# Patient Record
Sex: FEMALE | Race: BLACK OR AFRICAN AMERICAN | NOT HISPANIC OR LATINO
[De-identification: names, ages, dates, MRNs, and addresses within clinical notes are randomized per-mention and may not be internally consistent; named-entity substitution may affect disease eponyms.]

---

## 2019-02-20 PROBLEM — Z00.00 ENCOUNTER FOR PREVENTIVE HEALTH EXAMINATION: Status: ACTIVE | Noted: 2019-02-20

## 2019-02-28 ENCOUNTER — APPOINTMENT (OUTPATIENT)
Dept: PULMONOLOGY | Facility: CLINIC | Age: 44
End: 2019-02-28
Payer: MEDICAID

## 2019-02-28 VITALS
WEIGHT: 140 LBS | SYSTOLIC BLOOD PRESSURE: 98 MMHG | BODY MASS INDEX: 20.27 KG/M2 | HEIGHT: 69.5 IN | DIASTOLIC BLOOD PRESSURE: 70 MMHG | TEMPERATURE: 97.9 F

## 2019-02-28 DIAGNOSIS — Z87.891 PERSONAL HISTORY OF NICOTINE DEPENDENCE: ICD-10-CM

## 2019-02-28 DIAGNOSIS — Z82.5 FAMILY HISTORY OF ASTHMA AND OTHER CHRONIC LOWER RESPIRATORY DISEASES: ICD-10-CM

## 2019-02-28 PROCEDURE — 99204 OFFICE O/P NEW MOD 45 MIN: CPT | Mod: 25

## 2019-02-28 PROCEDURE — 94010 BREATHING CAPACITY TEST: CPT

## 2019-02-28 NOTE — CONSULT LETTER
[Dear  ___] : Dear  [unfilled], [( Thank you for referring [unfilled] for consultation for _____ )] : Thank you for referring [unfilled] for consultation for [unfilled] [Please see my note below.] : Please see my note below. [Consult Closing:] : Thank you very much for allowing me to participate in the care of this patient.  If you have any questions, please do not hesitate to contact me. [Sincerely,] : Sincerely, [FreeTextEntry2] : Isidro Delvalle MD\par [address] \[place] [address] [FreeTextEntry3] : Marychuy Allison MD, FCCP\par

## 2019-02-28 NOTE — HISTORY OF PRESENT ILLNESS
[FreeTextEntry1] : Yvan TOBIN.\par \par 02/28/2019: Asked to evaluate patient by Dr Delvalle for cough. She was seen at [facility] for fever and chest congestion 1/28/19. She was told she did not have pneumonia (by CXR) but did have COPD. She was given prednisone. She was given azithromycin but continued to have discolored sputum and so Dr Delvalle gave her another abx which she doesn't know the name of. She quit smoking cigarettes in 2013 but does smoke cigars up till a month ago when she was sick. No chronic cough or dyspnea. Never gets sick. She is well now. Purpose of this consult is to find out of she has COPD.\par

## 2019-02-28 NOTE — ASSESSMENT
[FreeTextEntry1] : Data reviewed:\par \par PA/lat CXR [facility] 1/28/19 report only: clear lungs, scoliosis\par \par [facility] 02/28/2019 : normal\par \par Impression:\par Resolved respiratory infection\par \par Plan:\par No further care or follow up is needed for this illness. She does not have COPD.\par She is encouraged to maintain smoking cessation.

## 2020-10-20 ENCOUNTER — APPOINTMENT (OUTPATIENT)
Dept: NEUROLOGY | Facility: CLINIC | Age: 45
End: 2020-10-20
Payer: MEDICAID

## 2020-10-20 VITALS
HEART RATE: 95 BPM | OXYGEN SATURATION: 98 % | DIASTOLIC BLOOD PRESSURE: 78 MMHG | BODY MASS INDEX: 20.27 KG/M2 | SYSTOLIC BLOOD PRESSURE: 120 MMHG | TEMPERATURE: 98.5 F | HEIGHT: 69.5 IN | WEIGHT: 140 LBS

## 2020-10-20 DIAGNOSIS — G40.909 EPILEPSY, UNSPECIFIED, NOT INTRACTABLE, W/OUT STATUS EPILEPTICUS: ICD-10-CM

## 2020-10-20 DIAGNOSIS — M54.16 RADICULOPATHY, LUMBAR REGION: ICD-10-CM

## 2020-10-20 DIAGNOSIS — M51.27 OTHER INTERVERTEBRAL DISC DISPLACEMENT, LUMBOSACRAL REGION: ICD-10-CM

## 2020-10-20 PROCEDURE — 99204 OFFICE O/P NEW MOD 45 MIN: CPT | Mod: 25

## 2020-10-20 PROCEDURE — 99072 ADDL SUPL MATRL&STAF TM PHE: CPT

## 2020-10-20 PROCEDURE — 95885 MUSC TST DONE W/NERV TST LIM: CPT

## 2020-10-20 PROCEDURE — 95909 NRV CNDJ TST 5-6 STUDIES: CPT

## 2020-10-20 NOTE — PROCEDURE
[FreeTextEntry1] : Nerve Conduction and Electromyography Report [FreeTextEntry3] : Electro Physiologic Findings:\par \par Limb temperature was monitored and maintained at approximately 30 – 34° C in the lower extremities.\par \par The left superficial fibular sensory response was normal. The left tibial motor amplitude was low, while the left was normal. The left fibular motor amplitude was borderline low, although limited by patient discomfort; on the right it was normal. The bilateral fibular and tibial F-waves appeared mildly prolonged, although this was also significantly limited by patient discomfort. \par \par Needle electromyography was performed on select left lower extremity L4-S2 appendicular muscles and the left L5/S1 paraspinal. There was no definite evidence of denervation in any of the muscles tested, although again the test was significantly limited by poor relaxation, submaximal effort, and patient discomfort. \par \par Clinical Electrophysiological Impression: \par \par This was a very limited electrodiagnostic study of the lower extremities. There was a suggestion of a left S1 radiculopathy, which correlates with clinical and radiographic findings. However, this could not be definitively proven due to the limited nature of the study. \par \par Note that despite the limited nature of the study, the lack of denervation suggests that there has not been a great deal of motor axon loss. A lesser degree of motor axon loss is still possible. \par \par There was no definite evidence of polyneuropathy on this study. \par

## 2020-10-20 NOTE — CONSULT LETTER
[Dear  ___] : Dear  [unfilled], [Please see my note below.] : Please see my note below. [Consult Letter:] : I had the pleasure of evaluating your patient, [unfilled]. [Consult Closing:] : Thank you very much for allowing me to participate in the care of this patient.  If you have any questions, please do not hesitate to contact me. [FreeTextEntry3] : Devon Van M.D.\par Neurology, Electromyography and Neuromuscular Medicine\par [facility]\par \par  of Neurology\par [facility] / [facility] [employer] [Sincerely,] : Sincerely, [DrAnna Marie  ___] : Dr. DURON

## 2020-10-20 NOTE — ASSESSMENT
[FreeTextEntry1] : Symptoms largely consistent with left S1 radiculopathy, which is consistent with MRI findings \par \par NCS/EMG today was very limited by severe pain during the procedure. There was a suggestion of a left S1 radiculopathy, which would fit with radiographic and clinical findings; however it could not be definitively proven due to the limited nature of the study. Regardless, it can be said that there is not a great deal of motor axon loss\par \par Recommend f/u with pain management and spine surgery, consider repeat MRI L spine since her symptoms seem to have gotten worse since January 2020. \par \par See separate procedure note for full results of study.

## 2020-10-20 NOTE — PHYSICAL EXAM
[FreeTextEntry1] : Gen: appears well, well-nourished, no acute distress\par \par MS: awake, alert, oriented, speech fluent, comprehension intact, good fund of knowledge, recent and remote memory intact, attention intact\par \par CN: PERRL, EOMI, visual fields full, facial strength and sensation intact and symmetric, hearing grossly intact, palate elevation symmetric, tongue midline, no tongue atrophy or fasciculations, shoulder shrug intact and symmetric\par \par Motor: normal bulk and tone; left leg somewhat pain limited but with encouragement gives 5/5 strength throughout, no abnormal movements\par \par Sensory: light touch intact and symmetric throughout\par \par Reflexes: 2+ symmetric throughout, no Romero’s sign, plantar responses flexor bilaterally\par \par Coordination: no dysmetria on finger to nose, Romberg negative\par \par Gait: somewhat antalgic but otherwise normal \par \par CV: 2+ pulses b/l, no edema\par \par Ophtho: fundi not visualized

## 2020-10-20 NOTE — HISTORY OF PRESENT ILLNESS
[FreeTextEntry1] : CC: back pain \par \par HPI: 45 year old woman referred by Dr. Delvalle for back pain \par \par Location: low back, radiating to left buttock and leg, to the bottom of the foot \par Quality: sharp, shooting pain \par Severity: severe \par Duration: years, but worse in past 6 months \par Timing: constant \par Context: no trauma \par Modifying Factors: percoset helps; first epidural helped, second did not\par Associated signs and symptoms:  tingling in the left leg \par \par Other Problem(s):\par Seizure disorder, last seizure in January 2020 due to medication non-compliance\par \par Data reviewed:\par Imaging (reports): x-ray chest - normal \par Imaging (independently reviewed): MRI L spine - left paracentral disc herniation at L5/S1 with contact of descending left S1 nerve root; scoliosis \par \par ROS: 13 pt review of systems performed and reviewed with patient (General, Eyes, Ears, Cardiovascular, Respiratory, Gastrointestinal, Genitourinary, Musculoskeletal, Skin, Endocrine, Hematologic, Psychiatric, Neurologic)\par Past medical history, surgical history, social history, and family history reviewed with patient\par See scanned document for details

## 2020-10-20 NOTE — CONSULT LETTER
[Dear  ___] : Dear  [unfilled], [Please see my note below.] : Please see my note below. [Consult Letter:] : I had the pleasure of evaluating your patient, [unfilled]. [Consult Closing:] : Thank you very much for allowing me to participate in the care of this patient.  If you have any questions, please do not hesitate to contact me. [FreeTextEntry3] : Devon Van M.D.\par Neurology, Electromyography and Neuromuscular Medicine\par [facility]\par \par  of Neurology\par [facility] / [facility] [employer] [Sincerely,] : Sincerely, [DrAnna Marie  ___] : Dr. DURON

## 2021-06-01 NOTE — PROCEDURE
[FreeTextEntry1] : Nerve Conduction and Electromyography Report [FreeTextEntry3] : Electro Physiologic Findings:\par \par Limb temperature was monitored and maintained at approximately 30 – 34° C in the lower extremities.\par \par The left superficial fibular sensory response was normal. The left tibial motor amplitude was low, while the left was normal. The left fibular motor amplitude was borderline low, although limited by patient discomfort; on the right it was normal. The bilateral fibular and tibial F-waves appeared mildly prolonged, although this was also significantly limited by patient discomfort. \par \par Needle electromyography was performed on select left lower extremity L4-S2 appendicular muscles and the left L5/S1 paraspinal. There was no definite evidence of denervation in any of the muscles tested, although again the test was significantly limited by poor relaxation, submaximal effort, and patient discomfort. \par \par Clinical Electrophysiological Impression: \par \par This was a very limited electrodiagnostic study of the lower extremities. There was a suggestion of a left S1 radiculopathy, which correlates with clinical and radiographic findings. However, this could not be definitively proven due to the limited nature of the study. \par \par Note that despite the limited nature of the study, the lack of denervation suggests that there has not been a great deal of motor axon loss. A lesser degree of motor axon loss is still possible. \par \par There was no definite evidence of polyneuropathy on this study. \par  Home

## 2022-08-22 ENCOUNTER — APPOINTMENT (OUTPATIENT)
Dept: PULMONOLOGY | Facility: CLINIC | Age: 47
End: 2022-08-22

## 2022-08-22 VITALS
DIASTOLIC BLOOD PRESSURE: 70 MMHG | BODY MASS INDEX: 19.26 KG/M2 | TEMPERATURE: 97.6 F | WEIGHT: 133 LBS | SYSTOLIC BLOOD PRESSURE: 118 MMHG | HEIGHT: 69.5 IN

## 2022-08-22 PROCEDURE — 99204 OFFICE O/P NEW MOD 45 MIN: CPT

## 2022-08-22 RX ORDER — ACETAMINOPHEN AND CODEINE PHOSPHATE 300; 60 MG/1; MG/1
300-60 TABLET ORAL
Qty: 21 | Refills: 0 | Status: ACTIVE | COMMUNITY
Start: 2022-07-08

## 2022-08-22 RX ORDER — CARBAMAZEPINE 200 MG/1
200 TABLET ORAL
Qty: 260 | Refills: 0 | Status: ACTIVE | COMMUNITY
Start: 2022-03-02

## 2022-08-22 RX ORDER — AZITHROMYCIN 250 MG/1
250 TABLET, FILM COATED ORAL
Qty: 6 | Refills: 0 | Status: DISCONTINUED | COMMUNITY
Start: 2022-06-30 | End: 2022-08-22

## 2022-08-22 RX ORDER — CYPROHEPTADINE HYDROCHLORIDE 4 MG/1
4 TABLET ORAL
Qty: 30 | Refills: 0 | Status: DISCONTINUED | COMMUNITY
Start: 2022-07-06 | End: 2022-08-22

## 2022-08-22 RX ORDER — POLYETHYLENE GLYCOL-3350 AND ELECTROLYTES WITH FLAVOR PACK 240; 5.84; 2.98; 6.72; 22.72 G/278.26G; G/278.26G; G/278.26G; G/278.26G; G/278.26G
240 POWDER, FOR SOLUTION ORAL
Qty: 4000 | Refills: 0 | Status: DISCONTINUED | COMMUNITY
Start: 2022-07-13 | End: 2022-08-22

## 2022-08-22 RX ORDER — PHENYTOIN SODIUM 100 MG/1
100 CAPSULE ORAL
Qty: 270 | Refills: 0 | Status: ACTIVE | COMMUNITY
Start: 2022-03-02

## 2022-08-22 RX ORDER — PREDNISONE 20 MG/1
20 TABLET ORAL
Qty: 10 | Refills: 0 | Status: DISCONTINUED | COMMUNITY
Start: 2022-06-30 | End: 2022-08-22

## 2022-08-22 NOTE — HISTORY OF PRESENT ILLNESS
[TextBox_4] : 02/28/2019: Asked to evaluate patient by Dr Delvalle for cough. She was seen at [facility] for fever and chest congestion 1/28/19. She was told she did not have pneumonia (by CXR) but did have COPD. She was given prednisone. She was given azithromycin but continued to have discolored sputum and so Dr Delvalle gave her another abx which she doesn't know the name of. She quit smoking cigarettes in 2013 but does smoke cigars up till a month ago when she was sick. No chronic cough or dyspnea. Never gets sick. She is well now. Purpose of this consult is to find out of she has COPD.\par \par 8/22/2022: Had bronchitis recently. Had a CXR in this setting and told there was a scar and sent back to see me. She was given azithro. She is feeling fine again. Has been checking peak flows and numbers have gone up. Had Covid 12/2021. Not smoking Newports but smoking MJ w tobacco and smokes flavored cigars. Working as HHA, had booster. [ESS] : 3

## 2022-08-22 NOTE — CONSULT LETTER
[Dear  ___] : Dear  [unfilled], [( Thank you for referring [unfilled] for consultation for _____ )] : Thank you for referring [unfilled] for consultation for [unfilled] [Please see my note below.] : Please see my note below. [Consult Closing:] : Thank you very much for allowing me to participate in the care of this patient.  If you have any questions, please do not hesitate to contact me. [Sincerely,] : Sincerely, [FreeTextEntry2] : Isidro Delvalle MD\par [address] \[place] [address]\par  [FreeTextEntry3] : Marychuy Allison MD, FCCP\par

## 2022-08-22 NOTE — ASSESSMENT
[FreeTextEntry1] : Data reviewed:\par \par PA/lat CXR [facility] 1/28/19 report only: clear lungs, scoliosis\par PA/lat CXR [facility] 7/2022 personally reviewed : read as inc markings - to me this is an overread on an overpenetrated film\par \par [facility] 02/28/2019 : normal\par \par Impression:\par Resolved respiratory infection\par \par Plan:\par She is asymptomatic with a clear chest, and the CXR was done at the time of the viral infection.\par Would not evaluate this CXR further.\par Rec smoking cessation.

## 2023-02-16 ENCOUNTER — APPOINTMENT (OUTPATIENT)
Dept: PULMONOLOGY | Facility: CLINIC | Age: 48
End: 2023-02-16
Payer: MEDICAID

## 2023-02-16 VITALS
WEIGHT: 142 LBS | TEMPERATURE: 97.6 F | HEIGHT: 69.5 IN | DIASTOLIC BLOOD PRESSURE: 80 MMHG | OXYGEN SATURATION: 97 % | BODY MASS INDEX: 20.56 KG/M2 | HEART RATE: 105 BPM | SYSTOLIC BLOOD PRESSURE: 120 MMHG

## 2023-02-16 PROCEDURE — 94060 EVALUATION OF WHEEZING: CPT

## 2023-02-16 PROCEDURE — 94727 GAS DIL/WSHOT DETER LNG VOL: CPT

## 2023-02-16 PROCEDURE — 99214 OFFICE O/P EST MOD 30 MIN: CPT | Mod: 25

## 2023-02-16 PROCEDURE — 94729 DIFFUSING CAPACITY: CPT

## 2023-02-16 NOTE — HISTORY OF PRESENT ILLNESS
[TextBox_4] : 02/28/2019: Asked to evaluate patient by Dr Delvalle for cough. She was seen at [facility] for fever and chest congestion 1/28/19. She was told she did not have pneumonia (by CXR) but did have COPD. She was given prednisone. She was given azithromycin but continued to have discolored sputum and so Dr Delvalle gave her another abx which she doesn't know the name of. She quit smoking cigarettes in 2013 but does smoke cigars up till a month ago when she was sick. No chronic cough or dyspnea. Never gets sick. She is well now. Purpose of this consult is to find out of she has COPD.\par \par 8/22/2022: Had bronchitis recently. Had a CXR in this setting and told there was a scar and sent back to see me. She was given azithro. She is feeling fine again. Has been checking peak flows and numbers have gone up. Had Covid 12/2021. Not smoking Newports but smoking MJ w tobacco and smokes flavored cigars. Working as HHA, had booster.\par \par 2/16/2023: Referred back by Dr Delvalle for dyspnea and nodule. A month ago had sudden onset of dyspnea at [facility],  called EMS but she declined transport to hospital. [She reports to me now that she had an elevated troponin level in 2013 and was thought to need a stent but her cath was clean. She also reports now that she had a DVT in 2016.] When this happened a second time she accepted transport to hospital. She was taken to [facility] and had a CT w contrast but not CTA protocol - this excluded clot to the lobar level and showed a 4mm nodule. LE doppler was negative. Then she went to [facility] ED for concern of a potential clot in her arm and found to have phlebitis. Hasn't smoked MJ since this event but still smoking cigarettes intermittently. She is only a light smoker, a pack would last days, never a 1/2 or 1 ppd at all. Loosies, cigars, MJ mixed w tobacco. No FH lung cancer.

## 2023-02-16 NOTE — CONSULT LETTER
[Dear  ___] : Dear  [unfilled], [Courtesy Letter:] : I had the pleasure of seeing your patient, [unfilled], in my office today. [Please see my note below.] : Please see my note below. [Consult Closing:] : Thank you very much for allowing me to participate in the care of this patient.  If you have any questions, please do not hesitate to contact me. [Sincerely,] : Sincerely, [FreeTextEntry2] : Isidro Delvalle MD\par [address] \[place] [address] [FreeTextEntry3] : Marychuy Allison MD, FCCP\par

## 2023-02-16 NOTE — ASSESSMENT
[FreeTextEntry1] : Data reviewed:\par \par PA/lat CXR [facility] 1/28/19 report only: clear lungs, scoliosis\par PA/lat CXR [facility] 7/2022 : read as inc markings - to me this is an overread on an overpenetrated film\par CT w contrast [facility] 1/2023 report only: possible mild emphysema, 4mm nodule\par \par [facility] 02/28/2019 : normal, ratio 74%, FEV1 90%, FVC 99%\par [facility] 2/16/23: mild obstruction, ratio 66%, FEV1 89%, %, TLC 94%, DLCO 72%\par \par Impression:\par Mild emphysema\par Intermittent/mild smoker\par 4mm nodule\par \par Plan:\par Out of an abundance of caution we will repeat the scan in one year given the smoking hx. She is asked to provide the [facility] scan on a disk for direct comparison.\par Technically has mild obstruction but with normal FEV1, and no change in FEV1 over time - just got a higher FVC on the full PFT today. This doesn’t require any treatment.\par Stop smoking.\par

## 2023-03-06 ENCOUNTER — APPOINTMENT (OUTPATIENT)
Dept: PULMONOLOGY | Facility: CLINIC | Age: 48
End: 2023-03-06

## 2024-01-08 DIAGNOSIS — R91.1 SOLITARY PULMONARY NODULE: ICD-10-CM
